# Patient Record
(demographics unavailable — no encounter records)

---

## 2024-10-15 NOTE — PHYSICAL EXAM
[de-identified] : Knee exam Constitutional: Well-nourished, well-developed, No acute distress Respiratory:  Good respiratory effort, no SOB Lymphatic: No regional lymphadenopathy, no lymphedema Psychiatric: Pleasant and normal affect, alert and oriented x3 Skin: Clean dry and intact B/L UE/LE Musculoskeletal: normal except where as noted in regional exam   Right Knee: APPEARANCE: no marked deformities, +swelling or malalignment POSITIVE TENDERNESS:  medial joint line NONTENDER: jt lines b/l & retinacula b/l, patellar & quadriceps tendons, MCL/LCL, ITB at the lateral femoral condyle & Gerdy's tubercle, pes bursa.  ROM: full & painful at end range RESISTIVE TESTING: painless resisted knee flex/ext.  SPECIAL TESTS: stable v/v stress. painless grind. neg Lachman's. neg ant/post drawer. + Vanna's. neg Thessaly test. neg Tucker's & Malacrae's NEURO: Normal sensation of LE, DTRs 2+/4 patella and achilles PULSES: 2+ DP/PT pulses B/L Hips: No asymmetry, malalignment, or swelling, Full ROM, 5/5 strength in flexion/ext, IR/ER, Abd/Add, Joints stable B/L Ankles: No asymmetry, malalignment, or swelling, Full ROM, 5/5 strength in DF/PF/Inv/Ev, Joints stable BIOMECHANICAL EXAM: no marked leg length discrepancy, [default value]hip abductor weakness b/l, no marked foot pronation, tight hams and ITB b/l.  Normal gait and station     [de-identified] : Start Imaging: The following radiographs were ordered and read by me during this patient's visit. I reviewed each radiograph in detail with the patient and discussed the findings as highlighted below.   4 Views of the bilateral knees were obtained today that show no fracture, or dislocation. There are mild degenerative changes seen. There is no malalignment. No obvious osseous abnormality. Otherwise unremarkable.

## 2024-10-15 NOTE — HISTORY OF PRESENT ILLNESS
[de-identified] : MARIBETH is a 46 year old M who presents with bilateral knee pain, R>L.  Pain is primarily located at the posterior aspect of the right knee.  It began 1 month ago, without injury or trauma. Patient stated that he has begun walking a lot within the past year, however, does not recall onset of knee pain until 1 month ago. Reports sensation of locking Pain is described as sharp in nature, worse with sitting, bending and transitioning from sit to stand, better with rest.   Denies Bruising/swelling Denies prior injury No prior imaging Denies bowel/bladder changes, fevers, chills, saddle anesthesia.  Denies numbness, tingling, weakness of the lower extremities.

## 2024-10-15 NOTE — PHYSICAL EXAM
[de-identified] : Knee exam Constitutional: Well-nourished, well-developed, No acute distress Respiratory:  Good respiratory effort, no SOB Lymphatic: No regional lymphadenopathy, no lymphedema Psychiatric: Pleasant and normal affect, alert and oriented x3 Skin: Clean dry and intact B/L UE/LE Musculoskeletal: normal except where as noted in regional exam   Right Knee: APPEARANCE: no marked deformities, +swelling or malalignment POSITIVE TENDERNESS:  medial joint line NONTENDER: jt lines b/l & retinacula b/l, patellar & quadriceps tendons, MCL/LCL, ITB at the lateral femoral condyle & Gerdy's tubercle, pes bursa.  ROM: full & painful at end range RESISTIVE TESTING: painless resisted knee flex/ext.  SPECIAL TESTS: stable v/v stress. painless grind. neg Lachman's. neg ant/post drawer. + Vanna's. neg Thessaly test. neg Tucker's & Malacrae's NEURO: Normal sensation of LE, DTRs 2+/4 patella and achilles PULSES: 2+ DP/PT pulses B/L Hips: No asymmetry, malalignment, or swelling, Full ROM, 5/5 strength in flexion/ext, IR/ER, Abd/Add, Joints stable B/L Ankles: No asymmetry, malalignment, or swelling, Full ROM, 5/5 strength in DF/PF/Inv/Ev, Joints stable BIOMECHANICAL EXAM: no marked leg length discrepancy, [default value]hip abductor weakness b/l, no marked foot pronation, tight hams and ITB b/l.  Normal gait and station     [de-identified] : Start Imaging: The following radiographs were ordered and read by me during this patient's visit. I reviewed each radiograph in detail with the patient and discussed the findings as highlighted below.   4 Views of the bilateral knees were obtained today that show no fracture, or dislocation. There are mild degenerative changes seen. There is no malalignment. No obvious osseous abnormality. Otherwise unremarkable.

## 2024-10-15 NOTE — DISCUSSION/SUMMARY
[de-identified] : Discussed findings of today's exam and possible causes of the patient's pain. Educated the patient on their most probable diagnosis of bilateral knee pain, likely secondary to osteoarthritis. Reviewed possible courses of treatment, and we collaboratively decided the best course of treatment at this time will include: 1.  Evaluated right knee under ultrasound for signs of effusion. No presence of joint effusion seen. Declines injection. 2. Patient was referred to physical therapy 3. Will follow up in 6 weeks   Essie Lepe MD, [facility] Department of Orthopedics  I Ismael Carpenter ATC, assisted in the history and documentation of the patient with Dr Essie Lepe on 10/15/2024.   I, Essie Lepe MD, [facility], personally performed the services described in the documentation, reviewed the documentation recorded by the scribe in my presence and it accurately and completely records my words and actions.

## 2024-10-15 NOTE — HISTORY OF PRESENT ILLNESS
[de-identified] : MARIBETH is a 46 year old M who presents with bilateral knee pain, R>L.  Pain is primarily located at the posterior aspect of the right knee.  It began 1 month ago, without injury or trauma. Patient stated that he has begun walking a lot within the past year, however, does not recall onset of knee pain until 1 month ago. Reports sensation of locking Pain is described as sharp in nature, worse with sitting, bending and transitioning from sit to stand, better with rest.   Denies Bruising/swelling Denies prior injury No prior imaging Denies bowel/bladder changes, fevers, chills, saddle anesthesia.  Denies numbness, tingling, weakness of the lower extremities.

## 2024-10-15 NOTE — DISCUSSION/SUMMARY
[de-identified] : Discussed findings of today's exam and possible causes of the patient's pain. Educated the patient on their most probable diagnosis of bilateral knee pain, likely secondary to osteoarthritis. Reviewed possible courses of treatment, and we collaboratively decided the best course of treatment at this time will include: 1.  Evaluated right knee under ultrasound for signs of effusion. No presence of joint effusion seen. Declines injection. 2. Patient was referred to physical therapy 3. Will follow up in 6 weeks   Essie Lepe MD, [facility] Department of Orthopedics  I Ismael Carpenter ATC, assisted in the history and documentation of the patient with Dr Essie Lepe on 10/15/2024.   I, Essie Lepe MD, [facility], personally performed the services described in the documentation, reviewed the documentation recorded by the scribe in my presence and it accurately and completely records my words and actions.

## 2025-03-12 NOTE — ASSESSMENT
[FreeTextEntry1] : The condition was explained to the patient. - prescribed PT for R shoulder. - pain guided activity modification: limit overhead activity and heavy/repetitive lifting. - patient declined NSAID.  F/u 6 weeks.

## 2025-03-12 NOTE — IMAGING
[de-identified] : RIGHT SHOULDER No swelling. no TTP. FF: 110, ER 20, IR to L1. shoulder abduction 5/5, forward flexion 5/5, external rotation 5/5, internal rotation 5/5. Sensation intact to light touch. negative Hawkin's test. + Neer impingement test.   XRAYS OF RIGHT SHOULDER (3 views - AP, LATERAL, AND AXILLARY VIEWS): no acute displaced fracture or dislocation.

## 2025-03-12 NOTE — HISTORY OF PRESENT ILLNESS
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 Pain worst when reaching behind his back. Denies injury.  Hx: Gout - on Allopurinol. [FreeTextEntry5] : 03/12/2025 MARIBETH IBARRA 47 year male Here for evaluation of right arm. patient denies any injury, States that 2 months ago he started having pain in his right arm. describes pain as shooting pain in bicep when reaching or extending arm. no prior treatment

## 2025-04-23 NOTE — HISTORY OF PRESENT ILLNESS
[de-identified] : 4/23/25: f/u RIGHT shoulder adhesive capsulitis. Attended PT x 12 sessions, but did not find therapy helpful, reports that it was mostly massages, e-stim, and ultrasound. Reports that his HEP has been more helpful with stretching.  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 Pain worst when reaching behind his back. Denies injury.  Hx: Gout - on Allopurinol. [FreeTextEntry5] : MARIBETH is here today to follow up on his RIGHT shoulder. pt states since last visit, pain decreased. states he transitioned to HEP.

## 2025-04-23 NOTE — IMAGING
[de-identified] : RIGHT SHOULDER No swelling. no TTP. FF: 110, ER 30, IR to L1. shoulder abduction 5/5, forward flexion 5/5, external rotation 5/5, internal rotation 5/5. Sensation intact to light touch. negative Hawkin's test. + Neer impingement test.   @3/12/25 XRAYS OF RIGHT SHOULDER (3 views - AP, LATERAL, AND AXILLARY VIEWS): no acute displaced fracture or dislocation.

## 2025-04-23 NOTE — ASSESSMENT
[FreeTextEntry1] : - MRI of R shoulder to evaluate for rotator cuff tear. - provided new PT Rx for R shoulder, recommend changing PT facility. - pain guided activity modification: limit overhead activity and heavy/repetitive lifting. - patient declined NSAID.  F/u with shoulder specialist after MRI.

## 2025-05-13 NOTE — IMAGING
[de-identified] : RIGHT SHOULDER  Inspection: No swelling.   Palpation : Tenderness is noted at the anterior shoulder and lateral shoulder.  Range of motion: There is pain with range of motion. There is decreased range of motion with pain. Active motion equals passive motion.   , ER 40  IR R hipStrength: There is pain, weakness, and discomfort with strength testing.  Neurological testings: motor and sensor intact distally.  Ligament Stability and Special Testss:   There is positive arc of pain.   Shoulder apprehension: neg  Shoulder relocation: neg  Obriens test: pos  Biceps Active test: +Uriostegui Labral Shear: neg  Impingement testing: pos  Sonu testing: pos  Whipple: pos  Cross Body Adduction: +

## 2025-05-13 NOTE — DISCUSSION/SUMMARY
[Medication Risks Reviewed] : Medication risks reviewed [de-identified] : We discussed their diagnosis and treatment options at length including the risks and benefits of both surgical and non-surgical options..   -The pertinent aspects of the natural history of adhesive capsulitis were reviewed with the patient, including the three broad stages of freezing, frozen, and thawing. I reviewed with the patient that this condition is often associated with diabetes mellitus and thyroid disorders. The time from disease onset until symptom resolution may be one to two years.  - We will continue conservative treatment with PT, icing, and anti-inflammatory medication.  - Physical Therapy will be prescribed to work on ROM along with a home exercise program.  - The risks, benefits, and alternatives to intra-articular corticosteroid injection were reviewed with the patient. The patient wished to proceed with this treatment course.  - If in 3 months no improvement, will consider a second injection.  - If at 9 months no improvement, will consider a capsular release.  next visit: consider csi  motion today , ER 40, IR R hip

## 2025-05-13 NOTE — DATA REVIEWED
[FreeTextEntry1] : Xray R shoulder: type 3 acromion, no fx/ sublux  MR R shoulder: Fraying anterior superior labrum. biceps tendinopathy with tenosynovitis and frying at the anchor, Posttraumatic thickening of anterior capsule, GH effusion, AC djd

## 2025-05-13 NOTE — HISTORY OF PRESENT ILLNESS
[de-identified] : The patient is a 47 year old right hand dominant _ who presents today complaining of right shoulder.    Date of Injury/Onset: 3/01/2025 Pain:    At Rest: 0/10   With Activity:  7/10   Mechanism of injury: denies injury  This is [not] a Work Related Injury being treated under Worker's Compensation.  This is [not] an athletic injury occurring associated with an interscholastic or organized sports team.  Quality of symptoms:  sharp radiating pain  Improves with:  nothing  Worse with:  Moving his arm in a certain direction  Prior treatment:  saw Dr. 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 states for a few months he has been dealing with right shoulder pain. has sharp pain when move his arm in certain direction. tried LDN.